# Patient Record
Sex: MALE | URBAN - METROPOLITAN AREA
[De-identification: names, ages, dates, MRNs, and addresses within clinical notes are randomized per-mention and may not be internally consistent; named-entity substitution may affect disease eponyms.]

---

## 2021-09-27 ENCOUNTER — OFFICE VISIT (OUTPATIENT)
Dept: URBAN - METROPOLITAN AREA CLINIC 3 | Facility: CLINIC | Age: 61
End: 2021-09-27

## 2021-09-27 DIAGNOSIS — H25.813 COMBINED FORMS OF AGE-RELATED CATARACT, BILATERAL: ICD-10-CM

## 2021-09-27 PROCEDURE — 92004 COMPRE OPH EXAM NEW PT 1/>: CPT | Performed by: OPHTHALMOLOGY

## 2021-09-27 RX ORDER — MULTIVITAMIN
TABLET ORAL
Qty: 0 | Refills: 0 | Status: ACTIVE
Start: 2021-09-27

## 2021-09-27 RX ORDER — GABAPENTIN 250 MG/5ML
SOLUTION ORAL
Qty: 0 | Refills: 0 | Status: ACTIVE
Start: 2021-09-27

## 2021-09-27 RX ORDER — LIDOCAINE 5% 5 G/100G
5 % GEL TOPICAL
Qty: 0 | Refills: 0 | Status: ACTIVE
Start: 2021-09-27

## 2021-09-27 RX ORDER — ENOXAPARIN SODIUM 100 MG/ML
INJECTION SUBCUTANEOUS
Qty: 0 | Refills: 0 | Status: ACTIVE
Start: 2021-09-27

## 2021-09-27 RX ORDER — AMOXICILLIN AND CLAVULANATE POTASSIUM 875; 125 MG/1; MG/1
TABLET, FILM COATED ORAL
Qty: 0 | Refills: 0 | Status: ACTIVE
Start: 2021-09-27

## 2021-09-27 RX ORDER — DOCUSATE SODIUM 100 MG/1
100 MG CAPSULE ORAL
Qty: 0 | Refills: 0 | Status: ACTIVE
Start: 2021-09-27

## 2021-09-27 RX ORDER — AMLODIPINE BESYLATE 5 MG/1
5 MG TABLET ORAL
Qty: 0 | Refills: 0 | Status: ACTIVE
Start: 2021-09-27

## 2021-09-27 RX ORDER — ACETAMINOPHEN 500MG/15ML
LIQUID (ML) ORAL
Qty: 0 | Refills: 0 | Status: ACTIVE
Start: 2021-09-27

## 2021-09-27 RX ORDER — CHLORHEXIDINE GLUCONATE 1.2 MG/ML
0.12 % RINSE ORAL
Qty: 0 | Refills: 0 | Status: ACTIVE
Start: 2021-09-27

## 2021-09-27 ASSESSMENT — KERATOMETRY
OS: 43.75
OD: 43.75

## 2021-09-27 ASSESSMENT — INTRAOCULAR PRESSURE
OS: 9
OD: 9
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
## 2021-11-22 ENCOUNTER — OFFICE VISIT (OUTPATIENT)
Dept: URBAN - METROPOLITAN AREA CLINIC 3 | Facility: CLINIC | Age: 61
End: 2021-11-22

## 2021-11-22 DIAGNOSIS — H50.15 ALTERNATING EXOTROPIA: ICD-10-CM

## 2021-11-22 DIAGNOSIS — H52.223 REGULAR ASTIGMATISM, BILATERAL: Primary | ICD-10-CM

## 2021-11-22 PROCEDURE — 92014 COMPRE OPH EXAM EST PT 1/>: CPT | Performed by: OPHTHALMOLOGY

## 2021-11-22 ASSESSMENT — INTRAOCULAR PRESSURE
OS: 13
OD: 11

## 2021-11-22 ASSESSMENT — KERATOMETRY
OS: 43.88
OD: 43.75
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
## 2022-09-16 ENCOUNTER — OFFICE VISIT (OUTPATIENT)
Dept: URBAN - METROPOLITAN AREA CLINIC 6 | Facility: CLINIC | Age: 62
End: 2022-09-16

## 2022-09-16 DIAGNOSIS — H43.393 OTHER VITREOUS OPACITIES, BILATERAL: ICD-10-CM

## 2022-09-16 DIAGNOSIS — H52.223 REGULAR ASTIGMATISM, BILATERAL: ICD-10-CM

## 2022-09-16 DIAGNOSIS — H04.123 DRY EYE SYNDROME OF BILATERAL LACRIMAL GLANDS: ICD-10-CM

## 2022-09-16 DIAGNOSIS — H25.813 COMBINED FORMS OF AGE-RELATED CATARACT, BILATERAL: Primary | ICD-10-CM

## 2022-09-16 DIAGNOSIS — H17.9 CORNEAL SCAR: ICD-10-CM

## 2022-09-16 DIAGNOSIS — H50.15 ALTERNATING EXOTROPIA: ICD-10-CM

## 2022-09-16 DIAGNOSIS — H52.03 HYPERMETROPIA, BILATERAL: ICD-10-CM

## 2022-09-16 PROCEDURE — 92014 COMPRE OPH EXAM EST PT 1/>: CPT | Performed by: OPTOMETRIST

## 2022-09-16 ASSESSMENT — VISUAL ACUITY
OS: 20/20
OD: 20/20

## 2022-09-16 ASSESSMENT — INTRAOCULAR PRESSURE
OS: 17
OD: 16